# Patient Record
Sex: FEMALE | Race: WHITE | NOT HISPANIC OR LATINO | Employment: UNEMPLOYED | URBAN - METROPOLITAN AREA
[De-identification: names, ages, dates, MRNs, and addresses within clinical notes are randomized per-mention and may not be internally consistent; named-entity substitution may affect disease eponyms.]

---

## 2021-04-28 DIAGNOSIS — Z12.31 OTHER SCREENING MAMMOGRAM: ICD-10-CM

## 2021-08-02 PROBLEM — L63.9 ALOPECIA AREATA: Status: ACTIVE | Noted: 2021-08-02

## 2022-04-25 PROBLEM — M79.672 LEFT FOOT PAIN: Status: ACTIVE | Noted: 2022-04-25

## 2022-08-29 DIAGNOSIS — Z12.31 OTHER SCREENING MAMMOGRAM: ICD-10-CM

## 2022-08-31 ENCOUNTER — PATIENT MESSAGE (OUTPATIENT)
Dept: ADMINISTRATIVE | Facility: HOSPITAL | Age: 42
End: 2022-08-31

## 2022-09-15 PROBLEM — G89.29 CHRONIC MIDLINE THORACIC BACK PAIN: Status: ACTIVE | Noted: 2022-09-15

## 2022-09-15 PROBLEM — M54.6 CHRONIC MIDLINE THORACIC BACK PAIN: Status: ACTIVE | Noted: 2022-09-15

## 2022-09-15 PROBLEM — J30.2 SEASONAL ALLERGIES: Status: ACTIVE | Noted: 2022-09-15

## 2023-06-15 ENCOUNTER — TELEPHONE (OUTPATIENT)
Dept: PHARMACY | Facility: CLINIC | Age: 43
End: 2023-06-15

## 2023-06-15 NOTE — TELEPHONE ENCOUNTER
Carolee, this is Rhonda Benavides, clinical pharmacist with [facility] that is part of your care team.  We have begun working on your prescription that your doctor has sent us. Our next steps include:     Working with your insurance company to obtain approval for your medication  Working with you to ensure your medication is affordable     We will be calling you along the way with updates on your medication but if you have any concerns or receive information that you would like to discuss please reach us at [phone].    Welcome call outcome: Left voicemail

## 2023-06-16 ENCOUNTER — SPECIALTY PHARMACY (OUTPATIENT)
Dept: PHARMACY | Facility: CLINIC | Age: 43
End: 2023-06-16

## 2023-06-23 ENCOUNTER — PATIENT MESSAGE (OUTPATIENT)
Dept: PHARMACY | Facility: CLINIC | Age: 43
End: 2023-06-23

## 2023-06-23 NOTE — TELEPHONE ENCOUNTER
Unable to contact patient following 3 call attempts for clinical initial. [employer] message also sent. Closing out at [facility].

## 2023-06-30 ENCOUNTER — SPECIALTY PHARMACY (OUTPATIENT)
Dept: PHARMACY | Facility: CLINIC | Age: 43
End: 2023-06-30

## 2023-06-30 NOTE — TELEPHONE ENCOUNTER
Specialty Pharmacy - Medication/Referral Authorization  Specialty Pharmacy - Initial Clinical Assessment    Specialty Medication Orders Linked to Encounter      Flowsheet Row Most Recent Value   Medication #1 baricitinib (OLUMIANT) 4 mg Tab (Order#[id number], Rx#[id number])          Patient Diagnosis   L63.9 - Alopecia areata    Subjective    Summer Hammond is a 42 y.o. female, who is followed by the specialty pharmacy service for management and education.    Recent Encounters       Date Type Provider Description    06/30/2023 Specialty Pharmacy Rhonda Benavides, [place] Referral Authorization; Initial Clinical Assessment    06/16/2023 Specialty Pharmacy Rhonda Benavides, [place] Referral Authorization            Current Outpatient Medications   Medication Sig    baricitinib (OLUMIANT) 4 mg Tab Take 1 tablet (4 mg total) by mouth once daily.    buprenorphine HCL (SUBUTEX) 8 mg Subl DISSOLVE 2 TABLETS UNDER TONGUE ONCE DAILY    cetirizine (ZYRTEC) 10 MG tablet Take 1 tablet (10 mg total) by mouth once daily.    dextroamphetamine-amphetamine 30 mg Tab Take 1 tablet by mouth.    fluticasone propionate (FLONASE) 50 mcg/actuation nasal spray 1 spray (50 mcg total) by Each Nostril route once daily.    diclofenac sodium (VOLTAREN) 1 % Gel Apply 2 g topically 4 (four) times daily.   Last reviewed on 6/30/2023 10:21 AM by Rhonda Benavides, PharmD    Review of patient's allergies indicates:   Allergen Reactions    Penicillins Other (See Comments)   Last reviewed on  6/30/2023 10:20 AM by Rhonda Benavides    Drug Interactions    Drug interactions evaluated: yes  Clinically relevant drug interactions identified: no  Provided the patient with educational material regarding drug interactions: not applicable           Assessment Questions - Documented Responses      Flowsheet Row Most Recent Value   Assessment    Medication Reconciliation completed for patient No   Goals of Therapy Status Partially achieving   Status of the patients ability to  "self-administer: Is Able   All education points have been covered with patient? Yes, supplemental printed education provided   Welcome packet contents reviewed and discussed with patient? Yes   Assesment completed? Yes   Plan Therapy continued   Do you need to open a clinical intervention (i-vent)? No   Do you want to schedule first shipment? Yes          Refill Questions - Documented Responses      Flowsheet Row Most Recent Value   Refill Screening Questions    When does the patient need to receive the medication? 07/13/23   Refill Delivery Questions    How will the patient receive the medication? Mail   When does the patient need to receive the medication? 07/13/23   Shipping Address Home   Address in [facility] confirmed and updated if neccessary? Yes   Expected Copay ($) 1   Is the patient able to afford the medication copay? Yes   Payment Method CC on file   Days supply of Refill 30   Supplies needed? No supplies needed   Shipment/Pickup Date: 07/06/23            Objective    She has a past medical history of ADHD (attention deficit hyperactivity disorder) and Alopecia areata.    Tried/failed medications: N/A    BP Readings from Last 4 Encounters:   06/13/23 113/67   03/09/23 111/66   02/10/23 121/85   12/09/22 118/74     Ht Readings from Last 4 Encounters:   06/13/23 5' 9" (1.753 m)   03/30/23 5' 9" (1.753 m)   03/09/23 5' 9" (1.753 m)   02/10/23 5' 9" (1.753 m)     Wt Readings from Last 4 Encounters:   06/13/23 90.7 kg (200 lb)   03/30/23 91.6 kg (202 lb)   03/09/23 89.4 kg (197 lb)   02/10/23 89.4 kg (197 lb)       The goals of prescribed drug therapy management include:  Supporting patient to meet the prescriber's medical treatment objectives  Improving or maintaining quality of life  Maintaining optimal therapy adherence  Minimizing and managing side effects      Goals of Therapy Status: Partially achieving    Assessment/Plan  Patient plans to continue therapy without changes      Indication, " dosage, appropriateness, effectiveness, safety and convenience of her specialty medication(s) were reviewed today.     Patient Education   Patient received education on the following:   Expectations and possible outcomes of therapy  Proper use, timely administration, and missed dose management  Duration of therapy  Side effects, including prevention, minimization, and management  Contraindications and safety precautions  New or changed medications, including prescribe and over the counter medications and supplements  Reviews recommended vaccinations, as appropriate  Storage, safe handling, and disposal    Patient has been on medications for approximately 3 months via samples and tolerating the medication well.     Tasks added this encounter   No tasks added.   Tasks due within next 3 months   7/3/2023 - Benefits Investigation  6/30/2023 - Initial Clinical Assessment/Patient Education (1 Time Occurence)  6/30/2023 - Set up Initial Fill     Rhonda Benavides, PharmD  [facility]  [address] [place] [id number]  Phone: [phone]  Fax: [phone]

## 2023-07-03 ENCOUNTER — PATIENT MESSAGE (OUTPATIENT)
Dept: PHARMACY | Facility: CLINIC | Age: 43
End: 2023-07-03

## 2023-08-01 ENCOUNTER — PATIENT MESSAGE (OUTPATIENT)
Dept: PHARMACY | Facility: CLINIC | Age: 43
End: 2023-08-01

## 2023-08-04 ENCOUNTER — PATIENT MESSAGE (OUTPATIENT)
Dept: PHARMACY | Facility: CLINIC | Age: 43
End: 2023-08-04

## 2023-08-04 ENCOUNTER — SPECIALTY PHARMACY (OUTPATIENT)
Dept: PHARMACY | Facility: CLINIC | Age: 43
End: 2023-08-04

## 2023-08-04 NOTE — TELEPHONE ENCOUNTER
Incoming call from pt regarding Olumiant refill. Pt was unsure of on hand count. Will follow up around 3:30-4 pm for on hand count.

## 2023-08-07 NOTE — TELEPHONE ENCOUNTER
Specialty Pharmacy - Refill Coordination    Specialty Medication Orders Linked to Encounter      Flowsheet Row Most Recent Value   Medication #1 baricitinib (OLUMIANT) 4 mg Tab (Order#[id number], Rx#[id number])            Refill Questions - Documented Responses      Flowsheet Row Most Recent Value   Patient Availability and HIPAA Verification    Does patient want to proceed with activity? Yes   HIPAA/medical authority confirmed? Yes   Relationship to patient of person spoken to? Self   Refill Screening Questions    Changes to allergies? No   Changes to medications? No   New conditions since last clinic visit? No   Unplanned office visit, urgent care, ED, or hospital admission in the last 4 weeks? No   How does patient/caregiver feel medication is working? Too soon to tell   Financial problems or insurance changes? No   How many doses of your specialty medications were missed in the last 4 weeks? 0   Would patient like to speak to a pharmacist? No   When does the patient need to receive the medication? 08/10/23   Refill Delivery Questions    How will the patient receive the medication? Mail   When does the patient need to receive the medication? 08/10/23   Shipping Address Home   Address in [facility] confirmed and updated if neccessary? Yes   Expected Copay ($) 3   Is the patient able to afford the medication copay? Yes   Payment Method CC on file   Days supply of Refill 30   Supplies needed? No supplies needed   Refill activity completed? Yes   Refill activity plan Refill scheduled   Shipment/Pickup Date: 08/08/23            Current Outpatient Medications   Medication Sig    baricitinib (OLUMIANT) 4 mg Tab Take 1 tablet (4 mg total) by mouth once daily.    buprenorphine HCL (SUBUTEX) 8 mg Subl DISSOLVE 2 TABLETS UNDER TONGUE ONCE DAILY    cetirizine (ZYRTEC) 10 MG tablet Take 1 tablet (10 mg total) by mouth once daily.    dextroamphetamine-amphetamine 30 mg Tab Take 1 tablet by mouth.    diclofenac sodium  (VOLTAREN) 1 % Gel Apply 2 g topically 4 (four) times daily.    fluticasone propionate (FLONASE) 50 mcg/actuation nasal spray 1 spray (50 mcg total) by Each Nostril route once daily.   Last reviewed on 6/30/2023 10:21 AM by Rhonda Benavides, [place]    Review of patient's allergies indicates:   Allergen Reactions    Penicillins Other (See Comments)    Last reviewed on  6/30/2023 10:20 AM by Rhonda Benavides      Tasks added this encounter   No tasks added.   Tasks due within next 3 months   8/7/2023 - Refill Coordination Outreach (1 time occurrence)     Rhonda Benavides, PharmD  [facility]  [address] [place] [id number]  Phone: [phone]  Fax: [phone]

## 2024-09-27 DIAGNOSIS — Z12.31 OTHER SCREENING MAMMOGRAM: ICD-10-CM

## 2024-09-30 ENCOUNTER — PATIENT MESSAGE (OUTPATIENT)
Dept: ADMINISTRATIVE | Facility: HOSPITAL | Age: 44
End: 2024-09-30